# Patient Record
Sex: FEMALE | Race: WHITE | HISPANIC OR LATINO | Employment: FULL TIME | URBAN - METROPOLITAN AREA
[De-identification: names, ages, dates, MRNs, and addresses within clinical notes are randomized per-mention and may not be internally consistent; named-entity substitution may affect disease eponyms.]

---

## 2024-06-14 ENCOUNTER — OFFICE VISIT (OUTPATIENT)
Dept: MEDICAL GROUP | Facility: MEDICAL CENTER | Age: 29
End: 2024-06-14
Payer: OTHER GOVERNMENT

## 2024-06-14 ENCOUNTER — HOSPITAL ENCOUNTER (OUTPATIENT)
Dept: RADIOLOGY | Facility: MEDICAL CENTER | Age: 29
End: 2024-06-14
Attending: FAMILY MEDICINE
Payer: OTHER GOVERNMENT

## 2024-06-14 VITALS
BODY MASS INDEX: 27.34 KG/M2 | HEART RATE: 72 BPM | OXYGEN SATURATION: 97 % | RESPIRATION RATE: 16 BRPM | TEMPERATURE: 97.5 F | HEIGHT: 61 IN | WEIGHT: 144.8 LBS | DIASTOLIC BLOOD PRESSURE: 68 MMHG | SYSTOLIC BLOOD PRESSURE: 112 MMHG

## 2024-06-14 DIAGNOSIS — G44.52 NEW DAILY PERSISTENT HEADACHE: ICD-10-CM

## 2024-06-14 DIAGNOSIS — M25.572 ACUTE LEFT ANKLE PAIN: ICD-10-CM

## 2024-06-14 DIAGNOSIS — S93.422A SPRAIN OF DELTOID LIGAMENT OF LEFT ANKLE, INITIAL ENCOUNTER: ICD-10-CM

## 2024-06-14 PROCEDURE — 3074F SYST BP LT 130 MM HG: CPT | Performed by: FAMILY MEDICINE

## 2024-06-14 PROCEDURE — 73610 X-RAY EXAM OF ANKLE: CPT | Mod: LT

## 2024-06-14 PROCEDURE — 3078F DIAST BP <80 MM HG: CPT | Performed by: FAMILY MEDICINE

## 2024-06-14 PROCEDURE — 99213 OFFICE O/P EST LOW 20 MIN: CPT | Performed by: FAMILY MEDICINE

## 2024-06-14 ASSESSMENT — FIBROSIS 4 INDEX: FIB4 SCORE: 0.34

## 2024-06-14 NOTE — PROGRESS NOTES
"Chief Complaint   Patient presents with    Foot Pain     Left foot sprain. Twisted hiking since 2024    Hip Pain     Since 2016. 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 Acute left ankle pain/Sprain of deltoid ligament of left ankle, initial encounter  She turned her ankle while out on a hike about a week ago.  There is some swelling.  On examination there is swelling at the deltoid ligament.  There is no significant bruising.  The ankle is stable to exam.  Discussed the role of compression and ice.  X-ray order discussed and placed.  - DX-ANKLE 3+ VIEWS LEFT; Future    2. New daily persistent headache  Headaches began in , previously very rare.  Have been escalating, now daily.  Takes excedrin migraine but is having to take it daily.  Denies change in smell, taste, vision.  The headache is located in the temples and sometimes right frontal.  Today all right sided.  Notes no weakness.   MRI order discussed and placed.  Have asked her to follow-up with her PCP as well.  - MR-BRAIN-W/O; Future        Patient Active Problem List    Diagnosis Date Noted    New daily persistent headache 2024    Sprain of deltoid ligament of left ankle 2024    LAITH (generalized anxiety disorder) 2023    Hearing reduced, bilateral 2023    Tinnitus of both ears 2023    LGSIL on Pap smear of cervix 2018       Current medicines (including changes today)  Current Outpatient Medications   Medication Sig Dispense Refill    sertraline (ZOLOFT) 50 MG Tab Take 1 Tablet by mouth every day. 90 Tablet 3     No current facility-administered medications for this visit.       No Known Allergies    ROS: As per HPI       Objective:     /68 (BP Location: Right arm, Patient Position: Sitting, BP Cuff Size: Adult)   Pulse 72   Temp 36.4 °C (97.5 °F) (Temporal)   Resp 16   Ht 1.549 m (5' 1\")  "  Wt 65.7 kg (144 lb 12.8 oz)   SpO2 97%  Body mass index is 27.36 kg/m².    Physical Exam:  Constitutional: Well-developed and well-nourished. Not diaphoretic. No distress. Lucid and fluent.  Skin: Skin is warm and dry. No rash noted.  Head: Atraumatic without lesions.  Eyes: Conjunctivae and extraocular motions are normal. Pupils are equal, round, and reactive to light. No scleral icterus.   Ears:  External ears unremarkable.   Neck: Supple, trachea midline. No thyromegaly present. No cervical or supraclavicular lymphadenopathy. No JVD or carotid bruits appreciated  Cardiovascular: Regular rate and rhythm.  Normal S1, S2 without murmur appreciated.  Chest: Effort normal. Clear to auscultation throughout. No adventitious sounds.   Extremities: No cyanosis, clubbing, erythema, nor edema.   Neurological: Alert and oriented x 3. No tremor.  EOMI.  No tongue fasciculation.  Movements symmetric. Negative Rhomberg, very steady.  Psychiatric:  Behavior, mood, and affect are appropriate.       Assessment and Plan:     29 y.o. female with the following issues:    1. Acute left ankle pain  DX-ANKLE 3+ VIEWS LEFT      2. Sprain of deltoid ligament of left ankle, initial encounter        3. New daily persistent headache  MR-BRAIN-W/O            Followup: Return if symptoms worsen or fail to improve.

## 2024-07-23 DIAGNOSIS — H10.9 CONJUNCTIVITIS, UNSPECIFIED CONJUNCTIVITIS TYPE, UNSPECIFIED LATERALITY: ICD-10-CM

## 2024-07-23 RX ORDER — POLYMYXIN B SULFATE AND TRIMETHOPRIM 1; 10000 MG/ML; [USP'U]/ML
1 SOLUTION OPHTHALMIC 4 TIMES DAILY
Qty: 10 ML | Refills: 0 | Status: SHIPPED | OUTPATIENT
Start: 2024-07-23 | End: 2024-08-02

## 2024-08-07 ENCOUNTER — APPOINTMENT (OUTPATIENT)
Dept: PHYSICAL THERAPY | Facility: REHABILITATION | Age: 29
End: 2024-08-07
Attending: NURSE PRACTITIONER
Payer: OTHER GOVERNMENT

## 2024-08-14 ENCOUNTER — APPOINTMENT (OUTPATIENT)
Dept: PHYSICAL THERAPY | Facility: REHABILITATION | Age: 29
End: 2024-08-14
Attending: NURSE PRACTITIONER
Payer: OTHER GOVERNMENT

## 2024-08-14 ENCOUNTER — APPOINTMENT (OUTPATIENT)
Dept: RADIOLOGY | Facility: MEDICAL CENTER | Age: 29
End: 2024-08-14
Attending: FAMILY MEDICINE
Payer: OTHER GOVERNMENT

## 2024-08-21 ENCOUNTER — APPOINTMENT (OUTPATIENT)
Dept: PHYSICAL THERAPY | Facility: REHABILITATION | Age: 29
End: 2024-08-21
Attending: NURSE PRACTITIONER
Payer: OTHER GOVERNMENT

## 2024-08-23 ENCOUNTER — APPOINTMENT (OUTPATIENT)
Dept: RADIOLOGY | Facility: MEDICAL CENTER | Age: 29
End: 2024-08-23
Attending: FAMILY MEDICINE
Payer: OTHER GOVERNMENT

## 2024-08-28 ENCOUNTER — APPOINTMENT (OUTPATIENT)
Dept: PHYSICAL THERAPY | Facility: REHABILITATION | Age: 29
End: 2024-08-28
Attending: NURSE PRACTITIONER
Payer: OTHER GOVERNMENT

## 2024-09-04 ENCOUNTER — APPOINTMENT (OUTPATIENT)
Dept: PHYSICAL THERAPY | Facility: REHABILITATION | Age: 29
End: 2024-09-04
Attending: NURSE PRACTITIONER
Payer: OTHER GOVERNMENT

## 2024-09-11 ENCOUNTER — APPOINTMENT (OUTPATIENT)
Dept: PHYSICAL THERAPY | Facility: REHABILITATION | Age: 29
End: 2024-09-11
Attending: NURSE PRACTITIONER
Payer: OTHER GOVERNMENT

## 2024-09-18 ENCOUNTER — APPOINTMENT (OUTPATIENT)
Dept: PHYSICAL THERAPY | Facility: REHABILITATION | Age: 29
End: 2024-09-18
Attending: NURSE PRACTITIONER
Payer: OTHER GOVERNMENT

## 2024-09-25 ENCOUNTER — APPOINTMENT (OUTPATIENT)
Dept: PHYSICAL THERAPY | Facility: REHABILITATION | Age: 29
End: 2024-09-25
Attending: NURSE PRACTITIONER
Payer: OTHER GOVERNMENT

## 2024-10-11 ENCOUNTER — APPOINTMENT (OUTPATIENT)
Dept: RADIOLOGY | Facility: MEDICAL CENTER | Age: 29
End: 2024-10-11
Attending: FAMILY MEDICINE
Payer: OTHER GOVERNMENT

## 2024-10-14 ENCOUNTER — HOSPITAL ENCOUNTER (OUTPATIENT)
Dept: RADIOLOGY | Facility: MEDICAL CENTER | Age: 29
End: 2024-10-14
Attending: FAMILY MEDICINE
Payer: OTHER GOVERNMENT

## 2024-10-14 DIAGNOSIS — G44.52 NEW DAILY PERSISTENT HEADACHE: ICD-10-CM

## 2024-10-14 PROCEDURE — 70551 MRI BRAIN STEM W/O DYE: CPT

## 2024-11-11 RX ORDER — ALBUTEROL SULFATE 90 UG/1
2 INHALANT RESPIRATORY (INHALATION)
COMMUNITY
Start: 2024-07-22 | End: 2025-07-22

## 2024-11-12 ENCOUNTER — APPOINTMENT (OUTPATIENT)
Dept: MEDICAL GROUP | Facility: MEDICAL CENTER | Age: 29
End: 2024-11-12
Payer: OTHER GOVERNMENT

## 2024-11-12 VITALS
HEART RATE: 62 BPM | RESPIRATION RATE: 14 BRPM | OXYGEN SATURATION: 97 % | DIASTOLIC BLOOD PRESSURE: 72 MMHG | SYSTOLIC BLOOD PRESSURE: 118 MMHG | WEIGHT: 144 LBS | BODY MASS INDEX: 27.19 KG/M2 | HEIGHT: 61 IN | TEMPERATURE: 97 F

## 2024-11-12 DIAGNOSIS — F41.1 GAD (GENERALIZED ANXIETY DISORDER): ICD-10-CM

## 2024-11-12 DIAGNOSIS — R68.82 DECREASED LIBIDO: ICD-10-CM

## 2024-11-12 DIAGNOSIS — Z23 NEED FOR VACCINATION: ICD-10-CM

## 2024-11-12 PROCEDURE — 3074F SYST BP LT 130 MM HG: CPT | Performed by: NURSE PRACTITIONER

## 2024-11-12 PROCEDURE — 90656 IIV3 VACC NO PRSV 0.5 ML IM: CPT

## 2024-11-12 PROCEDURE — 90471 IMMUNIZATION ADMIN: CPT

## 2024-11-12 PROCEDURE — 3078F DIAST BP <80 MM HG: CPT | Performed by: NURSE PRACTITIONER

## 2024-11-12 PROCEDURE — 99214 OFFICE O/P EST MOD 30 MIN: CPT | Mod: 25 | Performed by: NURSE PRACTITIONER

## 2024-11-12 RX ORDER — SERTRALINE HYDROCHLORIDE 100 MG/1
100 TABLET, FILM COATED ORAL DAILY
Qty: 90 TABLET | Refills: 3 | Status: SHIPPED | OUTPATIENT
Start: 2024-11-12

## 2024-11-12 RX ORDER — BUPROPION HYDROCHLORIDE 150 MG/1
150 TABLET ORAL EVERY MORNING
Qty: 90 TABLET | Refills: 3 | Status: SHIPPED | OUTPATIENT
Start: 2024-11-12

## 2024-11-12 NOTE — PROGRESS NOTES
"Subjective:     Mel Cooley is a 29 y.o. female presents to discuss:     Verbal consent was acquired by the patient to use [employer] ambient listening note generation during this visit Yes     History of Present Illness  The patient presents for follow up of anxiety and reports decreased libido    She reports that her current medication, sertraline, is no longer effective in managing her anxiety. She has been taking two doses daily for approximately a week but has not observed any improvement. Initially, the medication was beneficial, but its effectiveness seems to have diminished over time.    She is currently breastfeeding her 15-month-old child twice daily. She also mentions a decrease in her sex drive, which she attributes to her anxiety. Additionally, she is concerned about her weight.    ROS: : see above      Current Outpatient Medications:     sertraline (ZOLOFT) 100 MG Tab, Take 1 Tablet by mouth every day., Disp: 90 Tablet, Rfl: 3    buPROPion (WELLBUTRIN XL) 150 MG XL tablet, Take 1 Tablet by mouth every morning., Disp: 90 Tablet, Rfl: 3    albuterol 108 (90 Base) MCG/ACT Aero Soln inhalation aerosol, Inhale 2 Puffs., Disp: , Rfl:     sertraline (ZOLOFT) 50 MG Tab, Take 1 Tablet by mouth every day., Disp: 90 Tablet, Rfl: 3    No Known Allergies    Objective:     Vitals: /72   Pulse 62   Temp 36.1 °C (97 °F)   Resp 14   Ht 1.549 m (5' 1\")   Wt 65.3 kg (144 lb)   LMP 10/14/2024 (Approximate)   SpO2 97%   Breastfeeding Yes   BMI 27.21 kg/m²    General: Alert, pleasant, NAD  HEENT: Normocephalic.  Neck supple.   Respiratory: no distress, no audible wheezing, RR -WNL  Skin: Warm, dry, no rashes.  Extremities: No leg edema. No discoloration  Neurological: No tremors  Psych:  Affect/mood is normal, judgement is good, memory is intact, grooming is appropriate.      Assessment/Plan:      Assessment & Plan  1. Anxiety.  The dosage of sertraline will be increased to 100 mg. " Additionally, bupropion (Wellbutrin) 150 mg daily will be initiated. We researched the safety profile given she is currently breastfeeding. Thus far Wellbutrin poses low risk and it is reported to have a low RID profile <5% which is acceptable. She is advised to consult with her pediatrician regarding the safety of these medications while breastfeeding. Potential side effects of bupropion, including tachycardia and sweating, were discussed. She is instructed to monitor for oversedation or poor feeding in the infant.    2. Low libido.  Discussed the potential impact of anxiety and sertraline on libido. Recommended planning time away from the children to decompress and rekindle intimacy. The initiation of Wellbutrin will be added in hopes to have improved libido. No specific medication was prescribed for this issue.    3. Weight management.  Bupropion may assist with weight loss, which is beneficial overall.     4. Health Maintenance.  An influenza vaccine will be administered today.      1. LAITH (generalized anxiety disorder)  - sertraline (ZOLOFT) 100 MG Tab; Take 1 Tablet by mouth every day.  Dispense: 90 Tablet; Refill: 3  - buPROPion (WELLBUTRIN XL) 150 MG XL tablet; Take 1 Tablet by mouth every morning.  Dispense: 90 Tablet; Refill: 3    2. Decreased libido  - buPROPion (WELLBUTRIN XL) 150 MG XL tablet; Take 1 Tablet by mouth every morning.  Dispense: 90 Tablet; Refill: 3    3. Mother currently breast-feeding    4. Need for vaccination  - INFLUENZA VACCINE TRI INJ (PF)    Other orders  - albuterol 108 (90 Base) MCG/ACT Aero Soln inhalation aerosol; Inhale 2 Puffs.       No follow-ups on file.    {I have placed the above orders and discussed them with an approved delegating provider. The MA is performing the below orders under the direction of Dr. Kevin PYLE

## 2024-12-24 ENCOUNTER — OFFICE VISIT (OUTPATIENT)
Dept: URGENT CARE | Facility: PHYSICIAN GROUP | Age: 29
End: 2024-12-24
Payer: OTHER GOVERNMENT

## 2024-12-24 VITALS
HEART RATE: 122 BPM | RESPIRATION RATE: 16 BRPM | TEMPERATURE: 99.1 F | DIASTOLIC BLOOD PRESSURE: 60 MMHG | SYSTOLIC BLOOD PRESSURE: 90 MMHG | WEIGHT: 143 LBS | OXYGEN SATURATION: 96 % | HEIGHT: 61 IN | BODY MASS INDEX: 27 KG/M2

## 2024-12-24 DIAGNOSIS — J10.1 INFLUENZA A: ICD-10-CM

## 2024-12-24 DIAGNOSIS — J06.9 UPPER RESPIRATORY INFECTION, ACUTE: ICD-10-CM

## 2024-12-24 LAB
FLUAV RNA SPEC QL NAA+PROBE: POSITIVE
FLUBV RNA SPEC QL NAA+PROBE: NEGATIVE
RSV RNA SPEC QL NAA+PROBE: NEGATIVE
SARS-COV-2 RNA RESP QL NAA+PROBE: NEGATIVE

## 2024-12-24 PROCEDURE — 3074F SYST BP LT 130 MM HG: CPT

## 2024-12-24 PROCEDURE — 99213 OFFICE O/P EST LOW 20 MIN: CPT

## 2024-12-24 PROCEDURE — 3078F DIAST BP <80 MM HG: CPT

## 2024-12-24 PROCEDURE — 0241U POCT CEPHEID COV-2, FLU A/B, RSV - PCR: CPT

## 2024-12-24 RX ORDER — DEXTROMETHORPHAN HYDROBROMIDE AND PROMETHAZINE HYDROCHLORIDE 15; 6.25 MG/5ML; MG/5ML
5 SYRUP ORAL
Qty: 25 ML | Refills: 0 | Status: SHIPPED | OUTPATIENT
Start: 2024-12-24 | End: 2024-12-24

## 2024-12-24 RX ORDER — FLUTICASONE PROPIONATE 50 MCG
1 SPRAY, SUSPENSION (ML) NASAL DAILY
Qty: 16 G | Refills: 0 | Status: SHIPPED | OUTPATIENT
Start: 2024-12-24

## 2024-12-24 RX ORDER — BENZONATATE 100 MG/1
100 CAPSULE ORAL 3 TIMES DAILY PRN
Qty: 30 CAPSULE | Refills: 0 | Status: SHIPPED | OUTPATIENT
Start: 2024-12-24

## 2024-12-24 RX ORDER — OSELTAMIVIR PHOSPHATE 75 MG/1
75 CAPSULE ORAL 2 TIMES DAILY
Qty: 10 CAPSULE | Refills: 0 | Status: SHIPPED | OUTPATIENT
Start: 2024-12-24

## 2024-12-24 ASSESSMENT — ENCOUNTER SYMPTOMS
CHILLS: 1
FEVER: 1
VOMITING: 0
ABDOMINAL PAIN: 0
COUGH: 1
SORE THROAT: 0
SHORTNESS OF BREATH: 1
MYALGIAS: 1
NAUSEA: 0
SINUS PAIN: 1

## 2024-12-24 NOTE — PROGRESS NOTES
Chief Complaint   Patient presents with    Cold Symptoms     Fever, chills, body aches, headache, cough, x3 days        HISTORY OF PRESENT ILLNESS: Patient is a pleasant 29 y.o. female who presents to urgent care today cold and flulike symptoms for the last 3 days, taking OTC meds with little to no relief, patient denies any major chronic illness.    Patient Active Problem List    Diagnosis Date Noted    New daily persistent headache 06/14/2024    Sprain of deltoid ligament of left ankle 06/14/2024    LAITH (generalized anxiety disorder) 09/19/2023    Hearing reduced, bilateral 01/05/2023    Tinnitus of both ears 01/05/2023    LGSIL on Pap smear of cervix 12/31/2018       Allergies:Patient has no known allergies.    Current Outpatient Medications Ordered in Epic   Medication Sig Dispense Refill    fluticasone (FLONASE) 50 MCG/ACT nasal spray Administer 1 Spray into affected nostril(S) every day. 16 g 0    benzonatate (TESSALON) 100 MG Cap Take 1 Capsule by mouth 3 times a day as needed for Cough. 30 Capsule 0    oseltamivir (TAMIFLU) 75 MG Cap Take 1 Capsule by mouth 2 times a day. 10 Capsule 0    sertraline (ZOLOFT) 100 MG Tab Take 1 Tablet by mouth every day. 90 Tablet 3    buPROPion (WELLBUTRIN XL) 150 MG XL tablet Take 1 Tablet by mouth every morning. 90 Tablet 3    albuterol 108 (90 Base) MCG/ACT Aero Soln inhalation aerosol Inhale 2 Puffs.      sertraline (ZOLOFT) 50 MG Tab Take 1 Tablet by mouth every day. 90 Tablet 3     No current Epic-ordered facility-administered medications on file.       Past Medical History:   Diagnosis Date    'Light-for-dates' infant with signs of fetal malnutrition 8/16/2019    Cholestasis of pregnancy in third trimester 1/26/2018    NSTs 2x per week, deliver at 38wks       Social History     Tobacco Use    Smoking status: Never    Smokeless tobacco: Never   Vaping Use    Vaping status: Never Used   Substance Use Topics    Alcohol use: Yes     Comment: occ    Drug use: No       Family  "Status   Relation Name Status    Mo  Alive    Fa  Alive    Sis  Alive    Bro  Alive    MGMo      MGFa      PGMo      PGFa      OTHER  (Not Specified)    Bro  Alive    Sis  Alive    Neg Hx  (Not Specified)   No partnership data on file     Family History   Problem Relation Age of Onset    Other Father         prostate issues    No Known Problems Sister     No Known Problems Brother     Cancer Paternal Grandmother         throat    Cancer Other         colon cancer    No Known Problems Brother     No Known Problems Sister     Diabetes Neg Hx        Review of Systems   Constitutional:  Positive for chills, fever and malaise/fatigue.   HENT:  Positive for congestion and sinus pain. Negative for sore throat.    Respiratory:  Positive for cough and shortness of breath.    Gastrointestinal:  Negative for abdominal pain, nausea and vomiting.   Musculoskeletal:  Positive for myalgias.       Exam:  BP 90/60 (BP Location: Left arm, Patient Position: Sitting, BP Cuff Size: Adult)   Pulse (!) 122   Temp 37.3 °C (99.1 °F) (Temporal)   Resp 16   Ht 1.549 m (5' 1\")   Wt 64.9 kg (143 lb)   SpO2 96%   Physical Exam  Vitals reviewed.   Constitutional:       General: She is not in acute distress.     Appearance: Normal appearance. She is normal weight.   HENT:      Head: Normocephalic.      Right Ear: Tympanic membrane, ear canal and external ear normal.      Left Ear: Tympanic membrane, ear canal and external ear normal.      Nose: Congestion and rhinorrhea present.      Mouth/Throat:      Mouth: Mucous membranes are moist.      Pharynx: Oropharynx is clear. Posterior oropharyngeal erythema present.   Eyes:      Extraocular Movements: Extraocular movements intact.      Conjunctiva/sclera: Conjunctivae normal.      Pupils: Pupils are equal, round, and reactive to light.   Cardiovascular:      Rate and Rhythm: Regular rhythm. Tachycardia present.      Pulses: Normal pulses.   Pulmonary:      Effort: " Pulmonary effort is normal. No respiratory distress.      Breath sounds: Normal breath sounds.   Musculoskeletal:         General: Normal range of motion.      Cervical back: Normal range of motion.   Skin:     General: Skin is warm and dry.   Neurological:      General: No focal deficit present.      Mental Status: She is alert.   Psychiatric:         Mood and Affect: Mood normal.             Assessment/Plan:  1. Influenza A  - oseltamivir (TAMIFLU) 75 MG Cap; Take 1 Capsule by mouth 2 times a day.  Dispense: 10 Capsule; Refill: 0    2. Upper respiratory infection, acute  - POCT CoV-2, Flu A/B, RSV by PCR  - fluticasone (FLONASE) 50 MCG/ACT nasal spray; Administer 1 Spray into affected nostril(S) every day.  Dispense: 16 g; Refill: 0  - benzonatate (TESSALON) 100 MG Cap; Take 1 Capsule by mouth 3 times a day as needed for Cough.  Dispense: 30 Capsule; Refill: 0    Based on patient's physical presentation along with review of systems I do think they likely have a viral illness.  I did swab for viruses.,  Patient is tachycardic, otherwise vitals are within normal limits, lung sounds are clear to auscultation.  I did go ahead and place patient on Flonase, Tessalon. Advised patient to drink plenty of fluids, take Motrin and Tylenol as needed, vitamin C, D.  Patient is aware of the plan of care and agreeable at this time, encouraged them to follow-up if they continue to get worse or do not improve.    Patient tested positive for influenza, medications in the form of Tamiflu sent to the pharmacy, patient was notified via phone.    Supportive care, differential diagnoses, and indications for immediate follow-up discussed with patient.   Pathogenesis of diagnosis discussed including typical length and natural progression.   Instructed to return to clinic or nearest emergency department for any change in condition, further concerns, or worsening of symptoms.  Patient states understanding of the plan of care and discharge  instructions.  Instructed to make an appointment, for follow up, with primary care provider.    Please note that this dictation was created using voice recognition software. I have made every reasonable attempt to correct obvious errors, but I expect that there are errors of grammar and possibly content that I did not discover before finalizing the note.      [place] HAND

## 2025-04-28 ENCOUNTER — OFFICE VISIT (OUTPATIENT)
Dept: MEDICAL GROUP | Facility: MEDICAL CENTER | Age: 30
End: 2025-04-28
Payer: OTHER GOVERNMENT

## 2025-04-28 ENCOUNTER — HOSPITAL ENCOUNTER (OUTPATIENT)
Dept: LAB | Facility: MEDICAL CENTER | Age: 30
End: 2025-04-28
Attending: NURSE PRACTITIONER
Payer: OTHER GOVERNMENT

## 2025-04-28 VITALS
DIASTOLIC BLOOD PRESSURE: 60 MMHG | HEIGHT: 61 IN | OXYGEN SATURATION: 97 % | BODY MASS INDEX: 26.43 KG/M2 | RESPIRATION RATE: 12 BRPM | SYSTOLIC BLOOD PRESSURE: 110 MMHG | WEIGHT: 140 LBS | TEMPERATURE: 97.6 F | HEART RATE: 64 BPM

## 2025-04-28 DIAGNOSIS — R41.3 MEMORY CHANGES: ICD-10-CM

## 2025-04-28 DIAGNOSIS — E55.9 VITAMIN D DEFICIENCY: ICD-10-CM

## 2025-04-28 DIAGNOSIS — Z11.59 ENCOUNTER FOR HEPATITIS C SCREENING TEST FOR LOW RISK PATIENT: ICD-10-CM

## 2025-04-28 DIAGNOSIS — F41.1 GAD (GENERALIZED ANXIETY DISORDER): ICD-10-CM

## 2025-04-28 DIAGNOSIS — E66.3 OVERWEIGHT (BMI 25.0-29.9): ICD-10-CM

## 2025-04-28 DIAGNOSIS — R68.82 DECREASED LIBIDO: ICD-10-CM

## 2025-04-28 LAB
25(OH)D3 SERPL-MCNC: 28 NG/ML (ref 30–100)
ALBUMIN SERPL BCP-MCNC: 4.4 G/DL (ref 3.2–4.9)
ALBUMIN/GLOB SERPL: 1.7 G/DL
ALP SERPL-CCNC: 95 U/L (ref 30–99)
ALT SERPL-CCNC: 23 U/L (ref 2–50)
ANION GAP SERPL CALC-SCNC: 12 MMOL/L (ref 7–16)
AST SERPL-CCNC: 20 U/L (ref 12–45)
BILIRUB SERPL-MCNC: 0.3 MG/DL (ref 0.1–1.5)
BUN SERPL-MCNC: 12 MG/DL (ref 8–22)
CALCIUM ALBUM COR SERPL-MCNC: 8.8 MG/DL (ref 8.5–10.5)
CALCIUM SERPL-MCNC: 9.1 MG/DL (ref 8.5–10.5)
CHLORIDE SERPL-SCNC: 105 MMOL/L (ref 96–112)
CHOLEST SERPL-MCNC: 238 MG/DL (ref 100–199)
CO2 SERPL-SCNC: 24 MMOL/L (ref 20–33)
CREAT SERPL-MCNC: 0.73 MG/DL (ref 0.5–1.4)
ERYTHROCYTE [DISTWIDTH] IN BLOOD BY AUTOMATED COUNT: 45.6 FL (ref 35.9–50)
FASTING STATUS PATIENT QL REPORTED: NORMAL
FERRITIN SERPL-MCNC: 14.5 NG/ML (ref 10–291)
FOLATE SERPL-MCNC: 18.7 NG/ML
GFR SERPLBLD CREATININE-BSD FMLA CKD-EPI: 115 ML/MIN/1.73 M 2
GLOBULIN SER CALC-MCNC: 2.6 G/DL (ref 1.9–3.5)
GLUCOSE SERPL-MCNC: 81 MG/DL (ref 65–99)
HCT VFR BLD AUTO: 40.6 % (ref 37–47)
HCV AB SER QL: NORMAL
HDLC SERPL-MCNC: 74 MG/DL
HGB BLD-MCNC: 13.4 G/DL (ref 12–16)
IRON SATN MFR SERPL: 18 % (ref 15–55)
IRON SERPL-MCNC: 73 UG/DL (ref 40–170)
LDLC SERPL CALC-MCNC: 136 MG/DL
MCH RBC QN AUTO: 29.5 PG (ref 27–33)
MCHC RBC AUTO-ENTMCNC: 33 G/DL (ref 32.2–35.5)
MCV RBC AUTO: 89.4 FL (ref 81.4–97.8)
PLATELET # BLD AUTO: 313 K/UL (ref 164–446)
PMV BLD AUTO: 10.4 FL (ref 9–12.9)
POTASSIUM SERPL-SCNC: 3.8 MMOL/L (ref 3.6–5.5)
PROT SERPL-MCNC: 7 G/DL (ref 6–8.2)
RBC # BLD AUTO: 4.54 M/UL (ref 4.2–5.4)
SODIUM SERPL-SCNC: 141 MMOL/L (ref 135–145)
TIBC SERPL-MCNC: 401 UG/DL (ref 250–450)
TRIGL SERPL-MCNC: 138 MG/DL (ref 0–149)
TSH SERPL-ACNC: 2.88 UIU/ML (ref 0.38–5.33)
UIBC SERPL-MCNC: 328 UG/DL (ref 110–370)
VIT B12 SERPL-MCNC: 756 PG/ML (ref 211–911)
WBC # BLD AUTO: 7 K/UL (ref 4.8–10.8)

## 2025-04-28 PROCEDURE — 83550 IRON BINDING TEST: CPT

## 2025-04-28 PROCEDURE — 36415 COLL VENOUS BLD VENIPUNCTURE: CPT

## 2025-04-28 PROCEDURE — 80053 COMPREHEN METABOLIC PANEL: CPT

## 2025-04-28 PROCEDURE — 82728 ASSAY OF FERRITIN: CPT

## 2025-04-28 PROCEDURE — 80061 LIPID PANEL: CPT

## 2025-04-28 PROCEDURE — 83540 ASSAY OF IRON: CPT

## 2025-04-28 PROCEDURE — 86803 HEPATITIS C AB TEST: CPT

## 2025-04-28 PROCEDURE — 84425 ASSAY OF VITAMIN B-1: CPT

## 2025-04-28 PROCEDURE — 84443 ASSAY THYROID STIM HORMONE: CPT

## 2025-04-28 PROCEDURE — 82306 VITAMIN D 25 HYDROXY: CPT

## 2025-04-28 PROCEDURE — 82607 VITAMIN B-12: CPT

## 2025-04-28 PROCEDURE — 82746 ASSAY OF FOLIC ACID SERUM: CPT

## 2025-04-28 PROCEDURE — 85027 COMPLETE CBC AUTOMATED: CPT

## 2025-04-28 RX ORDER — BUSPIRONE HYDROCHLORIDE 10 MG/1
10 TABLET ORAL 2 TIMES DAILY PRN
Qty: 60 TABLET | Refills: 3 | Status: SHIPPED | OUTPATIENT
Start: 2025-04-28

## 2025-04-28 NOTE — PROGRESS NOTES
Subjective:     Mel Cooley is a 30 y.o. female presents to discuss:     Chief Complaint   Patient presents with    Follow-Up     Weight gain, migraine, discuss anxiety medication     Verbal consent was acquired by the patient to use [employer] ambient listening note generation during this visit Yes   History of Present Illness  The patient presents for evaluation of anxiety, weight management, and memory issues.    Since last OV   She has ceased breastfeeding and is seeking alternative treatment options for her anxiety, as she perceives her current regimen to be ineffective. She was last seen in November. At that time, she was on sertraline, which was not helping her anxiety. The dosage of sertraline was increased to 100 mg, and Wellbutrin was added. Despite these changes, no significant improvement in her symptoms has been observed. She continues to take sertraline but has not noticed any changes in her libido. She recalls a previous experience with a medication provided by her , which she found beneficial during the initial month of use, but its efficacy seemed to diminish over time. She expresses a preference for daily medication over as-needed treatment. She has not tried any other medications for her anxiety.    She is considering the use of semaglutide for weight loss and is contemplating a consultation with [facility] regarding this option.    She reports recent memory issues, specifically difficulty recalling conversations, which she attributes to increased stress levels. This problem has been ongoing for approximately 2 months, with a noticeable increase in the current month. She maintains a healthy lifestyle, including adequate sleep (8 hours), regular exercise (running 2 miles every other day), and hydration (2 liters of water daily). She does not consume alcohol and ensures proper nutrition. She does not have any history of head injuries, repetitive behaviors, or seizures. She  "is currently supplementing her diet with iron and potassium.    SOCIAL HISTORY  She does not consume alcohol.      Wt Readings from Last 4 Encounters:   04/28/25 63.5 kg (140 lb)   01/29/25 62.6 kg (138 lb)   12/24/24 64.9 kg (143 lb)   11/12/24 65.3 kg (144 lb)         ROS: : see above      Current Outpatient Medications:     busPIRone (BUSPAR) 10 MG Tab tablet, Take 1 Tablet by mouth 2 times a day as needed (anxiety proking event)., Disp: 60 Tablet, Rfl: 3    FLUoxetine (PROZAC) 20 MG Cap, Take 1 Capsule by mouth every day., Disp: 90 Capsule, Rfl: 3    fluticasone (FLONASE) 50 MCG/ACT nasal spray, Administer 1 Spray into affected nostril(S) every day., Disp: 16 g, Rfl: 0    buPROPion (WELLBUTRIN XL) 150 MG XL tablet, Take 1 Tablet by mouth every morning., Disp: 90 Tablet, Rfl: 3    albuterol 108 (90 Base) MCG/ACT Aero Soln inhalation aerosol, Inhale 2 Puffs., Disp: , Rfl:     No Known Allergies    Objective:   Vitals: /60   Pulse 64   Temp 36.4 °C (97.6 °F) (Temporal)   Resp 12   Ht 1.549 m (5' 1\")   Wt 63.5 kg (140 lb)   LMP 03/10/2025   SpO2 97%   Breastfeeding No   BMI 26.45 kg/m²    General: Alert, pleasant, NAD  HEENT: Normocephalic.  Neck supple.   Respiratory: no distress, no audible wheezing, RR -WNL  Skin: Warm, dry, no rashes.  Extremities: No leg edema. No discoloration  Neurological: No tremors  Psych:  Affect/mood is normal, judgement is good, memory is intact, grooming is appropriate.  Assessment/Plan:      1. LAITH (generalized anxiety disorder)  - busPIRone (BUSPAR) 10 MG Tab tablet; Take 1 Tablet by mouth 2 times a day as needed (anxiety proking event).  Dispense: 60 Tablet; Refill: 3  - FLUoxetine (PROZAC) 20 MG Cap; Take 1 Capsule by mouth every day.  Dispense: 90 Capsule; Refill: 3  - VITAMIN B1; Future  - VITAMIN B12; Future  - Comp Metabolic Panel; Future  - FERRITIN; Future  - IRON/TOTAL IRON BIND; Future  - TSH; Future  - FOLATE; Future  - CBC WITHOUT DIFFERENTIAL; Future  - " Lipid Profile; Future    2. Decreased libido    3. Vitamin D deficiency  - VITAMIN D,25 HYDROXY (DEFICIENCY); Future    4. Memory changes  - VITAMIN B1; Future  - VITAMIN B12; Future  - Comp Metabolic Panel; Future  - FERRITIN; Future  - IRON/TOTAL IRON BIND; Future  - TSH; Future  - FOLATE; Future  - CBC WITHOUT DIFFERENTIAL; Future  - Lipid Profile; Future    5. Encounter for hepatitis C screening test for low risk patient  - HEP C VIRUS ANTIBODY; Future    6. Overweight (BMI 25.0-29.9)       Assessment & Plan  Anxiety  The current regimen of sertraline 100 mg and Wellbutrin has not been effective in managing her anxiety. No significant improvement in anxiety symptoms or libido with the current medications.  Treatment plan: Discussed transitioning from sertraline to fluoxetine. The dosage of sertraline will be reduced by half for 5 days, during which she can commence fluoxetine. After this period, sertraline will be discontinued. A prescription for fluoxetine 20mg daily sent to pharmacy-recommend she continue Wellbutrin.    Weight management  She is interested in exploring semaglutide for weight loss.  Clinical decision making: Informed that semaglutide is typically approved for individuals with a diabetes diagnosis. She will pursue consultation with [facility], BMI <27 at this time. We reviewed the importance of ingredients in the GLP prescribed from [facility]. She will be prepared with questions regarding manufacturing and supply.    Memory issues  Reports recent memory issues, which may be related to increased stress and anxiety. No significant changes in sleep, exercise, alcohol consumption, or hydration.  Diagnostic plan: A comprehensive blood work panel will be ordered to assess for potential deficiencies that could be contributing to her memory issues. The panel will include tests for cholesterol, blood count, thyroid, iron, electrolytes, liver function, kidney function, glucose, vitamin D, thiamine, folic acid,  and B12. A one-time hepatitis C screening will also be conducted.  Treatment plan: If deficiencies are identified, appropriate supplementation will be initiated. If no deficiencies are found, her memory function will be monitored.    Follow-up: The patient will follow up in 3 months.    Return in about 3 months (around 7/28/2025).    {I have placed the above orders and discussed them with an approved delegating provider. The MA is performing the below orders under the direction of Dr. Kevin PYLE    Health maintenance:    General Recommendations:   Smoking: recommend complete avoidance of all tobacco products  Alcohol: recommend limiting consumption  Physical Activity: goal is 30 min of moderate activity 5 times a week  Weight Management and Nutrition: high vegetable, high protein diet like the Mediterranean diet, portion control, avoid excessive sugars, Low Glycemic Index foods, adequate hydration, sleep.

## 2025-05-01 ENCOUNTER — RESULTS FOLLOW-UP (OUTPATIENT)
Dept: MEDICAL GROUP | Facility: MEDICAL CENTER | Age: 30
End: 2025-05-01

## 2025-05-02 LAB — VIT B1 BLD-MCNC: 166 NMOL/L (ref 70–180)

## 2025-05-29 ENCOUNTER — APPOINTMENT (OUTPATIENT)
Dept: MEDICAL GROUP | Facility: MEDICAL CENTER | Age: 30
End: 2025-05-29
Payer: OTHER GOVERNMENT

## 2025-07-01 ENCOUNTER — APPOINTMENT (OUTPATIENT)
Dept: MEDICAL GROUP | Facility: MEDICAL CENTER | Age: 30
End: 2025-07-01
Payer: OTHER GOVERNMENT

## 2025-07-07 ENCOUNTER — APPOINTMENT (OUTPATIENT)
Dept: MEDICAL GROUP | Facility: MEDICAL CENTER | Age: 30
End: 2025-07-07
Payer: OTHER GOVERNMENT

## 2025-07-07 VITALS
RESPIRATION RATE: 14 BRPM | TEMPERATURE: 97.3 F | SYSTOLIC BLOOD PRESSURE: 110 MMHG | WEIGHT: 138 LBS | DIASTOLIC BLOOD PRESSURE: 70 MMHG | HEIGHT: 61 IN | BODY MASS INDEX: 26.06 KG/M2 | OXYGEN SATURATION: 96 % | HEART RATE: 62 BPM

## 2025-07-07 DIAGNOSIS — L65.9 HAIR LOSS: ICD-10-CM

## 2025-07-07 DIAGNOSIS — E61.1 IRON DEFICIENCY: ICD-10-CM

## 2025-07-07 DIAGNOSIS — E78.5 DYSLIPIDEMIA: ICD-10-CM

## 2025-07-07 DIAGNOSIS — E55.9 VITAMIN D DEFICIENCY: Primary | ICD-10-CM

## 2025-07-07 DIAGNOSIS — L65.9 ALOPECIA: ICD-10-CM

## 2025-07-07 DIAGNOSIS — M54.2 CERVICALGIA: ICD-10-CM

## 2025-07-07 PROCEDURE — 3078F DIAST BP <80 MM HG: CPT | Performed by: NURSE PRACTITIONER

## 2025-07-07 PROCEDURE — 99214 OFFICE O/P EST MOD 30 MIN: CPT | Performed by: NURSE PRACTITIONER

## 2025-07-07 PROCEDURE — 3074F SYST BP LT 130 MM HG: CPT | Performed by: NURSE PRACTITIONER

## 2025-07-07 ASSESSMENT — FIBROSIS 4 INDEX: FIB4 SCORE: 0.37

## 2025-07-07 NOTE — PROGRESS NOTES
Subjective:     HPI:     Mel Cooley is a 30 y.o. female presents to discuss:   Chief Complaint   Patient presents with    Other     Hair loss  Bad headaches, starts at neck and causes Rt arm  and back numbness     Verbal consent was acquired by the patient to use [employer] ambient listening note generation during this visit Yes     Last OV 4/2025  -labs reviewed  -Lipid panel -mild elevation of LDL  -CBC-WNL  -TSH-WNL  -CMP-WNL  -B12-WNL  -Vitamin D low-recommend OTC supplementation   -Iron studies-on lower end-recommended OTC iron supplementation   -started on Fluoxetine last OV for anxiety    History of Present Illness  The patient presents for evaluation of hair loss and neck pain.    She has been experiencing hair loss, which she first noticed approximately 7 weeks ago. Initially, she thought it was due to an accidental shave with her razor but later realized this was not the case. She has not observed any regrowth of the lost hair. The bald spot was discovered while she was touching her hair, and it has remained unchanged in size. She has no history of autoimmune disorders. She has been applying a hair loss treatment to the affected area.    She has been taking OTC iron supplementation since notified of low iron stores-although she has been taking daily.    -she has also started on GLP    She reports that her headaches originate from her neck, which she believes she mentioned previously. She experiences sharp neck pain that radiates to her head, causing numbness on the right side of her arm and back. She is uncertain if these symptoms are related to a previous accident, which resulted in a bulging disc. She also reports stiffness in her neck. She has not previously taken muscle relaxers. She received trigger point injections in the past with modest improvement.     ROS: : see above    Current Medications[1]    Allergies[2]    Objective:     Vitals: /70   Pulse 62   Temp 36.3 °C  "(97.3 °F) (Temporal)   Resp 14   Ht 1.549 m (5' 1\")   Wt 62.6 kg (138 lb)   LMP 06/25/2025 (Approximate)   SpO2 96%   BMI 26.07 kg/m²      General: Alert, pleasant, NAD  HEENT: Normocephalic.  Neck supple.   Respiratory: no distress, no audible wheezing, RR -WNL  Skin: Warm, dry, no rashes. \"Quarter\" sized smooth patch on mid-posterior scalp  Extremities: No leg edema. No discoloration  Neurological: No tremors  Psych:  Affect/mood is normal, judgement is good, memory is intact, grooming is appropriate.  Results  Labs   - B12: Normal   - LDL: Mildly elevated   - Hemoglobin: Normal   - Hematocrit: Normal   - HDL: Normal   - Folic Acid: Normal   - Thyroid: Normal   - TSAT: Above 60   - Circulating Iron: Above 60   - Ferritin: Low    Imaging   - MRI of brain: 10/2024, Normal     Assessment/Plan:      1. Iron deficiency  Mild.  Recommend ferrous sulfate 3 times weekly.  Recheck 3 to 6 months.    2. Hair loss  Possibly in the setting of low iron.  Normal TSH.    3. Alopecia  New problem.  Localized \"quarter\" sized area mid posterior scalp.  Continue iron supplements 3 x weekly  TSH WNL  Referral to Dermatology  Consider additional additional labs to include autoimmune panel.  - Referral to Dermatology    4. Vitamin D deficiency  With recent vitamin D level in the low end.  Recommend supplementation 2000 to 4000 IU daily.    5. Dyslipidemia  Mild elevated LDL.  Great HDL.  Continue lifestyle/dietary modifications.    6. Cervicalgia  Not well-controlled.  Patient does have tenderness around right scapular border radiating pain posterior aspect of her neck and that had an occasional radiating numbness down right arm.  Reassurance provided most likely MSK.  Recommend muscle relaxer to try at nighttime caution regarding drowsiness.  Recommend heat, stretching.  Referral placed to physical therapy.   - Referral to Physical Therapy  - tizanidine (ZANAFLEX) 4 MG Tab; Take 0.5-1 Tablets by mouth at bedtime as needed (neck " and shoulder pain).  Dispense: 90 Tablet; Refill: 0     Assessment & Plan  1. Hair loss  - B12 levels are within the normal range  - Ferritin levels are low, suggesting a depletion of iron stores  - Thyroid function is normal, ruling out this as a potential cause  - Referral to dermatology for further evaluation  - Advised to take a photograph of the affected area to monitor any changes  - Iron supplementation recommended three times a week  - She was instructed to reach out if dermatology has recommended additional lab work to be done prior to office visit.    2. Neck pain  - Symptoms suggest tension and tightness in the neck muscles  - MRI of the neck does not seem necessary at this point given absence of red flags.  - Advised to use heat and ice on the affected area  - Consider getting a neck massager for home use  - Muscle relaxer prescribed, start with half a tablet and adjust dosage as needed  - Referral to physical therapy for further evaluation and treatment  - If tizanidine does not provide relief, an alternative medication will be considered      Return if symptoms worsen or fail to improve.      General Recommendations:   Smoking: recommend complete avoidance of all tobacco products  Alcohol: recommend limiting consumption  Physical Activity: goal is 30 min of moderate activity 5 times a week  Weight Management and Nutrition: high vegetable, high protein diet like the Mediterranean diet, portion control, avoid excessive sugars, Low Glycemic Index foods, adequate hydration, sleep.     Please note that this dictation was created using voice recognition software. I have made every reasonable attempt to correct obvious errors, but I expect that there are errors of grammar and possibly content that I did not discover before finalizing the note.     Leonor PYLE          [1]   Current Outpatient Medications:     tizanidine (ZANAFLEX) 4 MG Tab, Take 0.5-1 Tablets by mouth at bedtime as needed  (neck and shoulder pain)., Disp: 90 Tablet, Rfl: 0    busPIRone (BUSPAR) 10 MG Tab tablet, Take 1 Tablet by mouth 2 times a day as needed (anxiety proking event)., Disp: 60 Tablet, Rfl: 3    FLUoxetine (PROZAC) 20 MG Cap, Take 1 Capsule by mouth every day., Disp: 90 Capsule, Rfl: 3    fluticasone (FLONASE) 50 MCG/ACT nasal spray, Administer 1 Spray into affected nostril(S) every day., Disp: 16 g, Rfl: 0    buPROPion (WELLBUTRIN XL) 150 MG XL tablet, Take 1 Tablet by mouth every morning., Disp: 90 Tablet, Rfl: 3    albuterol 108 (90 Base) MCG/ACT Aero Soln inhalation aerosol, Inhale 2 Puffs., Disp: , Rfl:   [2] No Known Allergies

## 2025-07-14 NOTE — Clinical Note
REFERRAL APPROVAL NOTICE         Sent on July 14, 2025                   Mel Cooley  [address] [place] [address]                   Dear Ms. Chris Cooley,    After a careful review of the medical information and benefit coverage, [facility] has processed your referral. See below for additional details.    If applicable, you must be actively enrolled with your insurance for coverage of the authorized service. If you have any questions regarding your coverage, please contact your insurance directly.    REFERRAL INFORMATION   Referral #:  [id number]  Referred-To Department    Referred-By Provider:  Dermatology    LASHANDA Rocha   [facility]      [address]  [place] [address]  [phone] [address]  [place] [id number] [phone]    Referral Start Date:  07/10/2025  Referral End Date:   01/06/2026             SCHEDULING  If you do not already have an appointment, please call [phone] to make an appointment.     MORE INFORMATION  If you do not already have a [employer] account, sign up at: [email or web address]  You can access your medical information, make appointments, see lab results, billing information, and more.  If you have questions regarding this referral, please contact  the [facility] Referrals department at:             [phone]. Monday - Friday 8:00AM - 5:00PM.     Sincerely,    [facility]

## 2025-07-16 NOTE — Clinical Note
REFERRAL APPROVAL NOTICE         Sent on July 16, 2025                   Mel Cooley  [address] [place] [address]                   Dear Ms. Chris Cooley,    After a careful review of the medical information and benefit coverage, [facility] has processed your referral. See below for additional details.    If applicable, you must be actively enrolled with your insurance for coverage of the authorized service. If you have any questions regarding your coverage, please contact your insurance directly.    REFERRAL INFORMATION   Referral #:  [id number]  Referred-To Department    Referred-By Provider:  Physical Therapy    LASHANDA Rocha   [facility]      [address]  [place] [id number] [phone] [address] [place] [address], [address]  [place] [address]  [phone]    Referral Start Date:  07/07/2025  Referral End Date:   07/07/2026             SCHEDULING  If you do not already have an appointment, please call [phone] to make an appointment.     MORE INFORMATION  If you do not already have a [employer] account, sign up at: [email or web address]  You can access your medical information, make appointments, see lab results, billing information, and more.  If you have questions regarding this referral, please contact  the [facility] Referrals department at:             [phone]. Monday - Friday 8:00AM - 5:00PM.     Sincerely,    [facility]

## 2025-07-29 ENCOUNTER — APPOINTMENT (OUTPATIENT)
Dept: MEDICAL GROUP | Facility: MEDICAL CENTER | Age: 30
End: 2025-07-29
Payer: OTHER GOVERNMENT